# Patient Record
(demographics unavailable — no encounter records)

---

## 2024-10-09 NOTE — PLAN
[TextEntry] : Start Neutrogena T-cell shampooStart Neutrogena T. Jerardo shampoo  Continue Sptyktu 6mg PO once a day Continue mometasone cream 0.1% to face once to twice a day as needed   RT 3 months

## 2024-10-09 NOTE — PHYSICAL EXAM
[Alert] : alert [Oriented x 3] : ~L oriented x 3 [Well Nourished] : well nourished [FreeTextEntry3] : Scalp: Moderate diffuse erythema and scaling-especially on crown Face: Mild to moderate thin pink scaly patches-especially lower forehead, eyebrows, and inner cheeks  Very small pink scaly plaques present on the right arm, right lower chest where small pink scaly plaques present on the right arm and lower lateral chest

## 2024-10-09 NOTE — HISTORY OF PRESENT ILLNESS
[FreeTextEntry1] : Psoriasis [de-identified] : Follow-up visit for 19-year-old white male first seen by me on August 5,, 2024, with then 1 year history of a progressive asymptomatic rash initially  beginning on the arms and legs and spreading to the face and scalp.  Previously seen at [facility]. 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  Diagnosed by me as having psoriasis of the distribution being mostly on the face is unusual. Note: Patient has history of frequent strep infections.  Treated with: Start Otezla titrated up to 30 mg p.o. twice daily-4 sample packs given Start mometasone cream 0.1% to face once or twice a day as needed -never used this  Patient stated this helped the rash on his face but not elsewhere. Patient also stated he has multiple bowel movements a day-approximately 9. This has been discontinued.  Diarrhea has resolved.  Baseline lab work done on August 5, 2024: CBC-within normal limits Comprehensive metabolic panel-within normal limits Lipid profile-within normal limits CPK-89 () Hepatitis B and C screen-negative (including negative hepatitis surface antibody) HIV-negative QuantiFERON-TB plus-negative  Patient started on Sotyku 6 mg p.o. once a day -2 sample packs given Start mometasone cream 0.1% to face once to twice a day as needed -uses this 2-3 times per week  Rash has improved on face.